# Patient Record
(demographics unavailable — no encounter records)

---

## 2024-10-15 NOTE — HISTORY OF PRESENT ILLNESS
[Daytime Somnolence] : daytime somnolence [Date: ___] : Date of most recent diagnostic polysomnogram: [unfilled] [AHI: ___ per hour] : Apnea-hypopnea index:  [unfilled] per hour [T90%: ___] : T90%: [unfilled]% [FreeTextEntry1] : Heavy snoring, waking up gasping/choking, fragmented sleep, EDS even though 0 on ESS.   HST done 9/2024 showed moderate CIERRA and possible Afib.   Saw Dr Tristan in the past. Advised to f/u wiht him after sleep study.  [ESS] : 0

## 2024-10-15 NOTE — PHYSICAL EXAM
[Normal Oropharynx] : abnormal oropharynx [General Appearance - In No Acute Distress] : no acute distress [Low Lying Soft Palate] : low lying soft palate [Elongated Uvula] : elongated uvula [II] : II [Heart Rate And Rhythm] : heart rate was normal and rhythm regular [] : no respiratory distress [Respiration, Rhythm And Depth] : normal respiratory rhythm and effort [Exaggerated Use Of Accessory Muscles For Inspiration] : no accessory muscle use [Auscultation Breath Sounds / Voice Sounds] : lungs were clear to auscultation bilaterally [Skin Color & Pigmentation] : normal skin color and pigmentation [Oriented To Time, Place, And Person] : oriented to person, place, and time [Impaired Insight] : insight and judgment were intact [Affect] : the affect was normal

## 2024-10-15 NOTE — PLAN
[TextEntry] : Reviewed treatments for sleep apnea - reviewed CPAP as first line therapy; reviewed OA, surgical options mainly Inspire. Will try CPAP as APAP. Reviewed use of CPAP. Different mask choices. Desensitization. Compliance goals. Cardio f/u ASAP. Will try to get sooner appt.

## 2024-11-12 NOTE — HISTORY OF PRESENT ILLNESS
[FreeTextEntry1] : Patient presents back today having been sent by his pulmonologist after having had a sleep study.  The sleep study showed moderate sleep apnea but also indicated possible atrial fibrillation.  Patient does report some palpitations at times.  He says sometimes when he stands up he will get some feeling of racing and skipping of his heart.  No dizziness or lightheadedness.  He does not report any other physical symptoms at this time.  Patient denies chest pain, shortness of breath, orthopnea, presyncope, syncope.

## 2024-11-12 NOTE — ASSESSMENT
[FreeTextEntry1] : Echocardiogram January 12, 2024 demonstrated left ventricle normal size and function with ejection fraction of 55 to 60%.  Mild tricuspid regurgitation.  No significant structural abnormality.  Exercise stress test January 12, 2024 during which the patient exercised via a Choco protocol for 8 minutes and 34 seconds, totaling 9 METS.  Peak heart rate achieved was 173 bpm, representing 91% of age-predicted maximum.  Blood pressure response to exercise was normal.  EKG showed no evidence of ischemia or arrhythmia with exercise.  Event monitor November 26 to December 9, 2023 showed a rhythm of sinus with an average heart rate of 79 beats minute, maximum 127, minimum 53 bpm.  No extrasystolic beating or any arrhythmia noted.  EKG: Sinus rhythm with no significant ST or T wave changes.  31-year-old male with a past medical history of dyslipidemia, prediabetes, CIERRA who presents today for follow-up evaluation after having had a recent sleep study showing possible atrial fibrillation.  It is unclear how accurate this evaluation for atrial fibrillation might be and it is certainly unlikely that he actually has atrial fibrillation but this needs to be further evaluated.  His monitor last year did not show any arrhythmia.  He does also note palpitations.  I have recommended a longer monitor for 30 days to further evaluate all of this.  EKG today sinus rhythm and otherwise unremarkable.

## 2024-11-12 NOTE — DISCUSSION/SUMMARY
[FreeTextEntry1] : 1.  30-day event monitor to evaluate for atrial fibrillation or other arrhythmia given the findings of his sleep study. 2.  Continue current cardiac meds in doses as noted above for prediabetes and dyslipidemia. 3.  Encourage patient to maintain healthy habits of diet and exercise. 4.  Follow-up with pulmonary for establishment of CPAP therapy. 5.  Follow up here in three months. [EKG obtained to assist in diagnosis and management of assessed problem(s)] : EKG obtained to assist in diagnosis and management of assessed problem(s)

## 2024-12-18 NOTE — PLAN
[TextEntry] : Continue to try to use CPAP. Desensitization reviewed. Will pull compliance in a month. Reviewed treatments for sleep apnea - reviewed  OA, surgical options mainly Inspire. Reviewed compliance goals. Cardio f/u.

## 2024-12-18 NOTE — PHYSICAL EXAM
[General Appearance - In No Acute Distress] : no acute distress [Low Lying Soft Palate] : low lying soft palate [Elongated Uvula] : elongated uvula [II] : II [Heart Rate And Rhythm] : heart rate was normal and rhythm regular [Respiration, Rhythm And Depth] : normal respiratory rhythm and effort [] : no respiratory distress [Exaggerated Use Of Accessory Muscles For Inspiration] : no accessory muscle use [Auscultation Breath Sounds / Voice Sounds] : lungs were clear to auscultation bilaterally [Skin Color & Pigmentation] : normal skin color and pigmentation [Oriented To Time, Place, And Person] : oriented to person, place, and time [Impaired Insight] : insight and judgment were intact [Affect] : the affect was normal [Normal Oropharynx] : abnormal oropharynx

## 2024-12-18 NOTE — ASSESSMENT
[FreeTextEntry1] : Moderate CIERRA; should be treated. Possible Afib on [employer]. Difficulty CPAP but got it <3 weeks ago.

## 2024-12-18 NOTE — HISTORY OF PRESENT ILLNESS
[Daytime Somnolence] : daytime somnolence [Date: ___] : Date of most recent diagnostic polysomnogram: [unfilled] [AHI: ___ per hour] : Apnea-hypopnea index:  [unfilled] per hour [T90%: ___] : T90%: [unfilled]% [FreeTextEntry1] : Heavy snoring, waking up gasping/choking, fragmented sleep, EDS even though 0 on ESS.   HST done 9/2024 showed moderate CIERRA and possible Afib. APAP ordered with N30i mask. He got it about 2 weeks ago. Difficulty using it and getting comfortable with it. Called the DME and they advised him on some things. He is committed to continue to try it.   Saw Dr Tristan and had monitor study since.   [CPAP: ___ cmH2O] : CPAP: [unfilled] cmH2O [ESS] : 0

## 2024-12-23 NOTE — ASSESSMENT
[FreeTextEntry1] : Obesity and preDM will increase Ozempic to 2 mg weekly to help with weight loss goals  HLD Continue atorvastatin 20 mg daily most recent lipid panel stable  CIERRA continue cpap  Follow-up in 3 months or as needed

## 2024-12-23 NOTE — HISTORY OF PRESENT ILLNESS
[de-identified] : 30 y/o male with pmhx of hld, pre-dm, presents for follow up. Patient overall doing well. Has started wearing cpap for ashlie. Patient feels that he is not losing weight even though he is not eating as much. Denies any adverse effects on ozempic. Patient recently seen by cardiology due to palpitations.

## 2025-07-07 NOTE — ASSESSMENT
[FreeTextEntry1] : palpitations, chest pain and headaches as per patient bp has been running high and is high with a systolic of 92 will trial amlodipine 2.5mg to see if it helps with his symptoms, advised to discontinue the medication if he has symptoms of low blood pressure if no improvement, it is possible patients symptoms could be panic attacks as cardio work up with unrevealing follow up in 3 weeks  Obesity and preDM continue Ozempic to 2 mg weekly to help with weight loss goals  HLD Continue atorvastatin 20 mg daily most recent lipid panel stable  CIERRA continue cpap

## 2025-07-07 NOTE — HISTORY OF PRESENT ILLNESS
[FreeTextEntry1] : follow up after ER visit [de-identified] : 32 y/o male with pmhx of hld, pre-dm, ashlie on cpap presents for follow up. Patient was recently seen in the ER due to chest pain. States he was sleeping (not with cpap on) and he woke up gasping for air with chest pain and pounding in his chest. His bp on the way to the ER was sbp of 200. When he arrived at the ER states it was around 130s. He had an ekg which was normal. States he was told it was due to his sleep apnea. He has now started wearing cpap again. He is concerned that his bp is high as when he checks it at the store it is always above 130/90. He feels that he will occasionally get palpitations and headaches. He does have feelings of anxiety as well